# Patient Record
Sex: MALE | Race: BLACK OR AFRICAN AMERICAN
[De-identification: names, ages, dates, MRNs, and addresses within clinical notes are randomized per-mention and may not be internally consistent; named-entity substitution may affect disease eponyms.]

---

## 2018-07-04 ENCOUNTER — HOSPITAL ENCOUNTER (EMERGENCY)
Dept: HOSPITAL 61 - ER | Age: 48
Discharge: HOME | End: 2018-07-04
Payer: COMMERCIAL

## 2018-07-04 DIAGNOSIS — R31.9: ICD-10-CM

## 2018-07-04 DIAGNOSIS — R10.32: Primary | ICD-10-CM

## 2018-07-04 DIAGNOSIS — J45.909: ICD-10-CM

## 2018-07-04 DIAGNOSIS — I10: ICD-10-CM

## 2018-07-04 DIAGNOSIS — E78.00: ICD-10-CM

## 2018-07-04 DIAGNOSIS — Z88.0: ICD-10-CM

## 2018-07-04 DIAGNOSIS — I25.2: ICD-10-CM

## 2018-07-04 LAB
ADD MAN DIFF?: NO
ANION GAP SERPL CALC-SCNC: 8 MMOL/L (ref 6–14)
BACTERIA #/AREA URNS HPF: 0 /HPF
BASO #: 0 X10^3/UL (ref 0–0.2)
BASO %: 0 % (ref 0–3)
BILIRUBIN,URINE: NEGATIVE
BLOOD UREA NITROGEN: 17 MG/DL (ref 8–26)
CALCIUM: 8.2 MG/DL (ref 8.5–10.1)
CHLORIDE: 103 MMOL/L (ref 98–107)
CLARITY,URINE: (no result)
CO2 SERPL-SCNC: 27 MMOL/L (ref 21–32)
COLOR,URINE: YELLOW
CREAT SERPL-MCNC: 1.4 MG/DL (ref 0.7–1.3)
EOS #: 0.1 X10^3/UL (ref 0–0.7)
EOS %: 2 % (ref 0–3)
GFR SERPLBLD BASED ON 1.73 SQ M-ARVRAT: 66 ML/MIN
GLUCOSE SERPL-MCNC: 108 MG/DL (ref 70–99)
GLUCOSE,URINE: NEGATIVE MG/DL
HCG SERPL-ACNC: 4.6 X10^3/UL (ref 4–11)
HEMATOCRIT: 39.7 % (ref 39–53)
HEMOGLOBIN: 13.8 G/DL (ref 13–17.5)
LYMPH #: 1.8 X10^3/UL (ref 1–4.8)
LYMPH %: 38 % (ref 24–48)
MEAN CORPUSCULAR HEMOGLOBIN: 30 PG (ref 25–35)
MEAN CORPUSCULAR HGB CONC: 35 G/DL (ref 31–37)
MEAN CORPUSCULAR VOLUME: 86 FL (ref 79–100)
MONO #: 0.4 X10^3/UL (ref 0–1.1)
MONO %: 8 % (ref 0–9)
NEUT #: 2.4 X10^3UL (ref 1.8–7.7)
NEUT %: 51 % (ref 31–73)
NITRITE UR QL STRIP: NEGATIVE
PH,URINE: 7.5
PLATELET COUNT: 230 X10^3/UL (ref 140–400)
POTASSIUM SERPL-SCNC: 3.3 MMOL/L (ref 3.5–5.1)
PROTEIN,URINE: NEGATIVE MG/DL
RBC,URINE: (no result) /HPF (ref 0–2)
RED BLOOD COUNT: 4.6 X10^6/UL (ref 4.3–5.7)
RED CELL DISTRIBUTION WIDTH: 13.5 % (ref 11.5–14.5)
SODIUM: 138 MMOL/L (ref 136–145)
SPECIFIC GRAVITY,URINE: 1.01
SQUAMOUS EPITHELIAL CELL,UR: (no result) /LPF
UROBILINOGEN,URINE: 0.2 MG/DL
WBC #/AREA URNS HPF: (no result) /HPF (ref 0–4)

## 2018-07-04 PROCEDURE — 96374 THER/PROPH/DIAG INJ IV PUSH: CPT

## 2018-07-04 PROCEDURE — 85025 COMPLETE CBC W/AUTO DIFF WBC: CPT

## 2018-07-04 PROCEDURE — 99285 EMERGENCY DEPT VISIT HI MDM: CPT

## 2018-07-04 PROCEDURE — 80048 BASIC METABOLIC PNL TOTAL CA: CPT

## 2018-07-04 PROCEDURE — 74177 CT ABD & PELVIS W/CONTRAST: CPT

## 2018-07-04 PROCEDURE — 36415 COLL VENOUS BLD VENIPUNCTURE: CPT

## 2018-07-04 PROCEDURE — 81001 URINALYSIS AUTO W/SCOPE: CPT

## 2018-07-04 PROCEDURE — 99284 EMERGENCY DEPT VISIT MOD MDM: CPT

## 2018-07-04 RX ADMIN — MORPHINE SULFATE 1 MG: 4 INJECTION, SOLUTION INTRAMUSCULAR; INTRAVENOUS at 04:18

## 2018-07-04 RX ADMIN — IOHEXOL 1 ML: 300 INJECTION, SOLUTION INTRAVENOUS at 04:51

## 2018-07-04 RX ADMIN — BACITRACIN 1 MLS/HR: 5000 INJECTION, POWDER, FOR SOLUTION INTRAMUSCULAR at 04:18

## 2018-08-06 ENCOUNTER — HOSPITAL ENCOUNTER (EMERGENCY)
Dept: HOSPITAL 61 - ER | Age: 48
Discharge: HOME | End: 2018-08-06
Payer: COMMERCIAL

## 2018-08-06 DIAGNOSIS — R00.2: ICD-10-CM

## 2018-08-06 DIAGNOSIS — K21.9: ICD-10-CM

## 2018-08-06 DIAGNOSIS — J45.909: ICD-10-CM

## 2018-08-06 DIAGNOSIS — Z88.0: ICD-10-CM

## 2018-08-06 DIAGNOSIS — R07.89: Primary | ICD-10-CM

## 2018-08-06 DIAGNOSIS — I10: ICD-10-CM

## 2018-08-06 DIAGNOSIS — E78.00: ICD-10-CM

## 2018-08-06 LAB
ADD MAN DIFF?: NO
ANION GAP SERPL CALC-SCNC: 10 MMOL/L (ref 6–14)
BASO #: 0 X10^3/UL (ref 0–0.2)
BASO %: 1 % (ref 0–3)
BLOOD UREA NITROGEN: 14 MG/DL (ref 8–26)
CALCIUM: 8.9 MG/DL (ref 8.5–10.1)
CHLORIDE: 104 MMOL/L (ref 98–107)
CO2 SERPL-SCNC: 27 MMOL/L (ref 21–32)
CREAT SERPL-MCNC: 1.4 MG/DL (ref 0.7–1.3)
EOS #: 0.1 X10^3/UL (ref 0–0.7)
EOS %: 2 % (ref 0–3)
GFR SERPLBLD BASED ON 1.73 SQ M-ARVRAT: 66 ML/MIN
GLUCOSE SERPL-MCNC: 108 MG/DL (ref 70–99)
HCG SERPL-ACNC: 4.7 X10^3/UL (ref 4–11)
HEMATOCRIT: 41.5 % (ref 39–53)
HEMOGLOBIN: 14.2 G/DL (ref 13–17.5)
LYMPH #: 1.9 X10^3/UL (ref 1–4.8)
LYMPH %: 39 % (ref 24–48)
MEAN CORPUSCULAR HEMOGLOBIN: 30 PG (ref 25–35)
MEAN CORPUSCULAR HGB CONC: 34 G/DL (ref 31–37)
MEAN CORPUSCULAR VOLUME: 87 FL (ref 79–100)
MONO #: 0.5 X10^3/UL (ref 0–1.1)
MONO %: 11 % (ref 0–9)
NEUT #: 2.2 X10^3UL (ref 1.8–7.7)
NEUT %: 48 % (ref 31–73)
PLATELET COUNT: 235 X10^3/UL (ref 140–400)
POTASSIUM SERPL-SCNC: 4.1 MMOL/L (ref 3.5–5.1)
RED BLOOD COUNT: 4.8 X10^6/UL (ref 4.3–5.7)
RED CELL DISTRIBUTION WIDTH: 13.7 % (ref 11.5–14.5)
SODIUM: 141 MMOL/L (ref 136–145)
TROPONINI: < 0.017 NG/ML (ref 0–0.06)

## 2018-08-06 PROCEDURE — 36415 COLL VENOUS BLD VENIPUNCTURE: CPT

## 2018-08-06 PROCEDURE — 93005 ELECTROCARDIOGRAM TRACING: CPT

## 2018-08-06 PROCEDURE — 71045 X-RAY EXAM CHEST 1 VIEW: CPT

## 2018-08-06 PROCEDURE — 84484 ASSAY OF TROPONIN QUANT: CPT

## 2018-08-06 PROCEDURE — 80048 BASIC METABOLIC PNL TOTAL CA: CPT

## 2018-08-06 PROCEDURE — 99285 EMERGENCY DEPT VISIT HI MDM: CPT

## 2018-08-06 PROCEDURE — 85025 COMPLETE CBC W/AUTO DIFF WBC: CPT

## 2018-08-06 RX ADMIN — Medication 1 ML: at 06:53

## 2018-08-06 RX ADMIN — ASPIRIN 1 MG: 325 TABLET, DELAYED RELEASE ORAL at 06:53

## 2018-08-06 RX ADMIN — BACITRACIN 1 MLS/HR: 5000 INJECTION, POWDER, FOR SOLUTION INTRAMUSCULAR at 07:51

## 2019-01-19 ENCOUNTER — HOSPITAL ENCOUNTER (EMERGENCY)
Dept: HOSPITAL 61 - ER | Age: 49
Discharge: HOME | End: 2019-01-19
Payer: COMMERCIAL

## 2019-01-19 VITALS
DIASTOLIC BLOOD PRESSURE: 95 MMHG | DIASTOLIC BLOOD PRESSURE: 95 MMHG | SYSTOLIC BLOOD PRESSURE: 145 MMHG | SYSTOLIC BLOOD PRESSURE: 145 MMHG | SYSTOLIC BLOOD PRESSURE: 145 MMHG | DIASTOLIC BLOOD PRESSURE: 95 MMHG

## 2019-01-19 VITALS — WEIGHT: 210 LBS | BODY MASS INDEX: 31.83 KG/M2 | HEIGHT: 68 IN

## 2019-01-19 DIAGNOSIS — K21.9: ICD-10-CM

## 2019-01-19 DIAGNOSIS — R07.89: Primary | ICD-10-CM

## 2019-01-19 DIAGNOSIS — Z88.0: ICD-10-CM

## 2019-01-19 DIAGNOSIS — I25.2: ICD-10-CM

## 2019-01-19 DIAGNOSIS — Z72.0: ICD-10-CM

## 2019-01-19 DIAGNOSIS — I10: ICD-10-CM

## 2019-01-19 DIAGNOSIS — J45.909: ICD-10-CM

## 2019-01-19 DIAGNOSIS — E78.00: ICD-10-CM

## 2019-01-19 LAB
ANION GAP SERPL CALC-SCNC: 8 MMOL/L (ref 6–14)
APTT PPP: YELLOW S
BACTERIA #/AREA URNS HPF: (no result) /HPF
BASOPHILS # BLD AUTO: 0 X10^3/UL (ref 0–0.2)
BASOPHILS NFR BLD: 1 % (ref 0–3)
BILIRUB UR QL STRIP: NEGATIVE
BUN SERPL-MCNC: 16 MG/DL (ref 8–26)
CALCIUM SERPL-MCNC: 8.8 MG/DL (ref 8.5–10.1)
CHLORIDE SERPL-SCNC: 105 MMOL/L (ref 98–107)
CO2 SERPL-SCNC: 28 MMOL/L (ref 21–32)
CREAT SERPL-MCNC: 1.5 MG/DL (ref 0.7–1.3)
EOSINOPHIL NFR BLD: 0.1 X10^3/UL (ref 0–0.7)
EOSINOPHIL NFR BLD: 4 % (ref 0–3)
ERYTHROCYTE [DISTWIDTH] IN BLOOD BY AUTOMATED COUNT: 13.1 % (ref 11.5–14.5)
FIBRINOGEN PPP-MCNC: CLEAR MG/DL
GFR SERPLBLD BASED ON 1.73 SQ M-ARVRAT: 60.7 ML/MIN
GLUCOSE SERPL-MCNC: 110 MG/DL (ref 70–99)
HCT VFR BLD CALC: 40.5 % (ref 39–53)
HGB BLD-MCNC: 14 G/DL (ref 13–17.5)
LYMPHOCYTES # BLD: 1.3 X10^3/UL (ref 1–4.8)
LYMPHOCYTES NFR BLD AUTO: 34 % (ref 24–48)
MCH RBC QN AUTO: 30 PG (ref 25–35)
MCHC RBC AUTO-ENTMCNC: 35 G/DL (ref 31–37)
MCV RBC AUTO: 87 FL (ref 79–100)
MONO #: 0.3 X10^3/UL (ref 0–1.1)
MONOCYTES NFR BLD: 9 % (ref 0–9)
NEUT #: 2 X10^3UL (ref 1.8–7.7)
NEUTROPHILS NFR BLD AUTO: 53 % (ref 31–73)
NITRITE UR QL STRIP: NEGATIVE
PH UR STRIP: 6.5 [PH]
PLATELET # BLD AUTO: 198 X10^3/UL (ref 140–400)
POTASSIUM SERPL-SCNC: 4 MMOL/L (ref 3.5–5.1)
PROT UR STRIP-MCNC: NEGATIVE MG/DL
RBC # BLD AUTO: 4.66 X10^6/UL (ref 4.3–5.7)
RBC #/AREA URNS HPF: (no result) /HPF (ref 0–2)
SODIUM SERPL-SCNC: 141 MMOL/L (ref 136–145)
SQUAMOUS #/AREA URNS LPF: (no result) /LPF
UROBILINOGEN UR-MCNC: 0.2 MG/DL
WBC # BLD AUTO: 3.9 X10^3/UL (ref 4–11)
WBC #/AREA URNS HPF: (no result) /HPF (ref 0–4)

## 2019-01-19 PROCEDURE — 83880 ASSAY OF NATRIURETIC PEPTIDE: CPT

## 2019-01-19 PROCEDURE — 84484 ASSAY OF TROPONIN QUANT: CPT

## 2019-01-19 PROCEDURE — 96374 THER/PROPH/DIAG INJ IV PUSH: CPT

## 2019-01-19 PROCEDURE — 36415 COLL VENOUS BLD VENIPUNCTURE: CPT

## 2019-01-19 PROCEDURE — 99284 EMERGENCY DEPT VISIT MOD MDM: CPT

## 2019-01-19 PROCEDURE — 80048 BASIC METABOLIC PNL TOTAL CA: CPT

## 2019-01-19 PROCEDURE — 85025 COMPLETE CBC W/AUTO DIFF WBC: CPT

## 2019-01-19 PROCEDURE — 93005 ELECTROCARDIOGRAM TRACING: CPT

## 2019-01-19 PROCEDURE — 81001 URINALYSIS AUTO W/SCOPE: CPT

## 2019-01-19 NOTE — EKG
[facility]

              [address] [place] [address]

Test Date:    2019               Test Time:    10:00:17

Pat Name:     CARMEN CLEARY               Department:   

Patient ID:   [id number]           Room:          

Gender:       M                        Technician:   

:          [id number]               Requested By: MITA JADE

Order Number: [id number]           Reading MD:   Humphrey Paige

                                 Measurements

Intervals                              Axis          

Rate:         59                       P:            30

OH:           138                      QRS:          -21

QRSD:         100                      T:            -5

QT:           412                                    

QTc:          408                                    

                           Interpretive Statements

SINUS RHYTHM

LEFTWARD AXIS

CONSIDER LEFT VENTRICULAR HYPERTROPHY

T ABNORMALITY IN INFERIOR LEADS

ABNORMAL ECG



Electronically Signed On 2019 16:47:53 CST by Humphrey Paige

## 2019-01-19 NOTE — PHYS DOC
Past Medical History


Past Medical History:  Asthma, High Cholesterol, Hypertension


Additional Past Medical Histor:  MILD MI


Past Surgical History:  Other


Additional Past Surgical Histo:  LIPOMA REMOVAL


Alcohol Use:  None


Drug Use:  None





Adult General


Chief Complaint


Chief Complaint:  ABDOMINAL PAIN





HPI


HPI





Patient is a 48  year old male who presents with chest pain.  Patient endorses 

a prior history of reflux. He states the symptoms feel similar to his reflux. 

He has been evaluated by his primary care doctor and is also scheduled to 

undergo stress testing in the next couple of weeks. Patient is treated with 

Carafate and some proton pump inhibitor medications at home. He woke this 

morning with onset of epigastric pain that he states radiates to his chest 

intermittently. He did not eat food prior to onset of symptoms. He has no 

shortness of breath. No radiation to the arm or jaw. No recent fever, chills, 

cough.  Patient does not have known history of coronary artery disease but he 

does take medications for high cholesterol and high blood pressure. He has 

tobacco use. He does not have diabetes.





Review of Systems


Review of Systems





Constitutional: Denies fever or chills 


Eyes: Denies change in visual acuity


HENT: Denies nasal congestion or sore throat 


Respiratory: Denies cough or shortness of breath 


Cardiovascular: No additional information not addressed in HPI 


GI: Denies abdominal pain, nausea


: Denies dysuria or hematuria


Musculoskeletal: Denies back pain 


Integument: Denies rash or skin lesions 


Neurologic: Denies headache


Endocrine: Denies polyuria





All other systems were reviewed and found to be within normal limits, except as 

documented in this note.





Current Medications


Current Medications





Current Medications








 Medications


  (Trade)  Dose


 Ordered  Sig/Neptali  Start Time


 Stop Time Status Last Admin


Dose Admin


 


 Amlodipine


 Besylate


  (Norvasc)  10 mg  1X  ONCE  19 07:00


 19 07:01 DC 19 06:33


10 MG


 


 Famotidine


  (Pepcid Vial)  20 mg  1X  ONCE  19 07:00


 19 07:01 DC 19 06:30


20 MG


 


 Multi-Ingredient


 Mouthwash/Gargle


  (Gi Cocktail)  20 ml  1X  ONCE  19 07:00


 19 07:01 DC 19 06:30


20 ML











Allergies


Allergies





Allergies








Coded Allergies Type Severity Reaction Last Updated Verified


 


  Penicillins Allergy Intermediate rash 18 Yes











Physical Exam


Physical Exam





Constitutional: Well developed, well nourished, no acute distress, non-toxic 

appearance


HENT: Normocephalic, atraumatic, bilateral external ears normal, oropharynx 

moist


Eyes: PERRLA, EOMI, conjunctiva normal 


Neck: Normal range of motion, no tenderness 


Cardiovascular:Heart rate regular rhythm, no murmur


Lungs & Thorax:  Bilateral breath sounds clear to auscultation 


Abdomen: Bowel sounds normal, soft, no tenderness 


Skin: Warm, dry, no erythema  


Extremities: No edema 


Neurologic: Alert and oriented X 3


Psychologic: Affect normal





Current Patient Data


Vital Signs





 Vital Signs








  Date Time  Temp Pulse Resp B/P (MAP) Pulse Ox O2 Delivery O2 Flow Rate FiO2


 


19 10:04  65 18 145/95 (112) 98 Room Air  


 


19 06:10 98.8       





 98.8       








Lab Values





 Laboratory Tests








Test


 19


06:28 19


07:09 19


09:30


 


White Blood Count


 3.9 x10^3/uL


(4.0-11.0)  L 


 





 


Red Blood Count


 4.66 x10^6/uL


(4.30-5.70) 


 





 


Hemoglobin


 14.0 g/dL


(13.0-17.5) 


 





 


Hematocrit


 40.5 %


(39.0-53.0) 


 





 


Mean Corpuscular Volume


 87 fL ()


 


 





 


Mean Corpuscular Hemoglobin 30 pg (25-35)    


 


Mean Corpuscular Hemoglobin


Concent 35 g/dL


(31-37) 


 





 


Red Cell Distribution Width


 13.1 %


(11.5-14.5) 


 





 


Platelet Count


 198 x10^3/uL


(140-400) 


 





 


Neutrophils (%) (Auto) 53 % (31-73)    


 


Lymphocytes (%) (Auto) 34 % (24-48)    


 


Monocytes (%) (Auto) 9 % (0-9)    


 


Eosinophils (%) (Auto) 4 % (0-3)  H  


 


Basophils (%) (Auto) 1 % (0-3)    


 


Neutrophils # (Auto)


 2.0 x10^3uL


(1.8-7.7) 


 





 


Lymphocytes # (Auto)


 1.3 x10^3/uL


(1.0-4.8) 


 





 


Monocytes # (Auto)


 0.3 x10^3/uL


(0.0-1.1) 


 





 


Eosinophils # (Auto)


 0.1 x10^3/uL


(0.0-0.7) 


 





 


Basophils # (Auto)


 0.0 x10^3/uL


(0.0-0.2) 


 





 


Sodium Level


 141 mmol/L


(136-145) 


 





 


Potassium Level


 4.0 mmol/L


(3.5-5.1) 


 





 


Chloride Level


 105 mmol/L


() 


 





 


Carbon Dioxide Level


 28 mmol/L


(21-32) 


 





 


Anion Gap 8 (6-14)    


 


Blood Urea Nitrogen


 16 mg/dL


(8-26) 


 





 


Creatinine


 1.5 mg/dL


(0.7-1.3)  H 


 





 


Estimated GFR


(Cockcroft-Gault) 60.7  


 


 





 


Glucose Level


 110 mg/dL


(70-99)  H 


 





 


Calcium Level


 8.8 mg/dL


(8.5-10.1) 


 





 


Troponin I Quantitative


 < 0.017 ng/mL


(0.000-0.055) 


 < 0.017 ng/mL


(0.000-0.055)


 


NT-Pro-B-Type Natriuretic


Peptide 8 pg/mL


(0-124) 


 





 


Urine Collection Type  Unknown   


 


Urine Color  Yellow   


 


Urine Clarity  Clear   


 


Urine pH  6.5   


 


Urine Specific Gravity  1.020   


 


Urine Protein


 


 Negative mg/dL


(NEG-TRACE) 





 


Urine Glucose (UA)


 


 Negative mg/dL


(NEG) 





 


Urine Ketones (Stick)


 


 Negative mg/dL


(NEG) 





 


Urine Blood


 


 Negative (NEG)


 





 


Urine Nitrite


 


 Negative (NEG)


 





 


Urine Bilirubin


 


 Negative (NEG)


 





 


Urine Urobilinogen Dipstick


 


 0.2 mg/dL (0.2


mg/dL) 





 


Urine Leukocyte Esterase


 


 Negative (NEG)


 





 


Urine RBC


 


 Occ /HPF (0-2)


 





 


Urine WBC


 


 1-4 /HPF (0-4)


 





 


Urine Squamous Epithelial


Cells 


 Few /LPF  


 





 


Urine Bacteria


 


 Few /HPF


(0-FEW) 





 


Urine Mucus  Mod /LPF   





 Laboratory Tests


19 06:28








 Laboratory Tests


19 06:28











EKG


EKG


No STEMI


Interpretation Time:


06:15





Radiology/Procedures


Radiology/Procedures


[]





Course & Med Decision Making


Course & Med Decision Making


Pertinent Labs and Imaging studies reviewed. (See chart for details)





06:15:  Patient is seen and examined.  GI cocktail and Pepcid ordered.  EKG is 

non-acute.





HEART Score:


History:  0


EC


Age:    1


Risk Factors:    2  (tobacco, HTN, HLD, fam hx)





Total Score:  3





07:40:  Patient currently improved.  BP trending downward.  No acute findings 

on lab panel.  Heart score 3.  Plan is to do 3-hr delta trop in ER.  Patient is 

agreeable.











Patient was evaluated in the emergency department for some chest pain which she 

states felt very typical for his reflux symptoms. Heart score totaled 3. 

Patient felt improved after GI cocktail and IV Pepcid. Patient was observed in 

the emergency department for a 3 hour delta troponin which was not elevated. 

His EKG was nonacute also at the 3 hour interval. Patient was discharged to 

home. He already has cardiology follow-up pending. Patient also is referred to 

GI for evaluation of chronic GERD symptoms that are poorly controlled on PPI 

therapy. Patient is agreeable to the plan of care. All of his questions are 

answered.





Dragon Disclaimer


Dragon Disclaimer


This electronic medical record was generated, in whole or in part, using a 

voice recognition dictation system.





Departure


Departure


Disposition:  01 HOME, SELF-CARE


Condition:  GOOD


Referrals:  


TIMOTHY ANDERSON MD (PCP)











MITA JADE DO 2019 06:20

## 2019-01-19 NOTE — EKG
[facility]

              [address] [place] [address]

Test Date:    2019               Test Time:    06:12:54

Pat Name:     CARMEN CLEARY               Department:   

Patient ID:   [id number]           Room:          

Gender:       Male                     Technician:   

:          [id number]               Requested By: MITA JADE

Order Number: [id number]           Reading MD:   Humphrey Paige

                                 Measurements

Intervals                              Axis          

Rate:         80                       P:            47

OK:           140                      QRS:          -22

QRSD:         102                      T:            7

QT:           374                                    

QTc:          435                                    

                           Interpretive Statements

SINUS RHYTHM

LEFTWARD AXIS

LEFT VENTRICULAR HYPERTROPHY



Electronically Signed On 2019 14:50:28 CST by Humphrey Paige

## 2019-02-22 ENCOUNTER — HOSPITAL ENCOUNTER (OUTPATIENT)
Dept: HOSPITAL 61 - ECHO | Age: 49
Discharge: HOME | End: 2019-02-22
Attending: INTERNAL MEDICINE
Payer: COMMERCIAL

## 2019-02-22 DIAGNOSIS — R53.83: ICD-10-CM

## 2019-02-22 DIAGNOSIS — I10: ICD-10-CM

## 2019-02-22 DIAGNOSIS — F17.210: ICD-10-CM

## 2019-02-22 DIAGNOSIS — R07.9: Primary | ICD-10-CM

## 2019-02-22 PROCEDURE — 93350 STRESS TTE ONLY: CPT

## 2019-02-22 PROCEDURE — 93017 CV STRESS TEST TRACING ONLY: CPT

## 2019-02-22 NOTE — CARD
MR#: [id number]

Account#: [id number]

Accession#: [id number]

Date of Study: 02/22/2019

Ordering Physician: YESI ORTEGA, 

Referring Physician: YESI ORTEGA, 

Tech: Silvia Vogt CS





--------------- APPROVED REPORT --------------





INDICATION

Chest Pain 



RISK FACTORS

Hypertension 

Smoking 



Reason : Patient complained of pain



PROCEDURE

The patient underwent an Exercise Stress Test using the Amrik Protocol. Blood pressure, heart rate, a
nd EKG were monitored.

An Echocardiogram was performed by technician in four stages in quad fashion.  At peak stress four se
lected images were obtained and placed side by side with resting images for comparison.



STRESS ECHO FINDINGS

The resting Echocardiogram showed normal left ventricular systolic contractility with an estimated Ej
ection Fraction of about 60 %. 

The Resting Echocardiogram showed normal augmentation of myocardial wall segments using a 16 segment 
model.

The Stress Echocardiogram showed normal augmentation of myocardial wall segments using a 16 segment m
yeimy.

The Stress Echocardiogram left ventricular systolic contractility has an estimated Ejection Fraction 
of about 75%. 



Test Type:          Exercise

Stress Nurse/Tech: Lanny Carmen R.N.

Test Indications: c/p

Cardiac History and Allergies: See EHR

Medications:     See EHR

Medical History: see EHR

Resting ECG:     SR

Resting Heart Rate: 65 bpm

Resting Blood Pressure: 158/80mmHg

Pretest Chest Pain: No chest pain



Nurse/Tech Notes

S1S2, lungs CTA



Stress Symptoms

chest pain left side scale 4/10 which resolved by end of recovery period, fatigue



POST EXERCISE

Reason for Termination: Reached target heart rate

Target HR: Yes

Max HR: 156 bpm

90% of Maximum Predicted HR: 173 bpm

Exercise duration: 9:03 min:sec, 3 Stage

Exercise capacity: 10.1METs

Max Blood Pressure: 210/102mmHg

Blood Pressure response to exercise: Normal blood pressure response during stress.

Heart Rate response to exercise: wnl

Chest Pain: Yes. see above note

Arrhythmia: No. 

ST Change: No. 



INTERPRETATION

Stress EKG Conclusion: Baseline EKG showed sinus rhythm.  No ischemic changes at peak stress.  No arr
hythmias.



Preliminary Notification

Critical Value: No



<Conclusion>

Treadmill exercise stress echocardiogram did not show any evidence of ischemia or infarct.

Normal left ventricle systolic function with ejection fraction estimated at 60%.

Patient had good activity tolerance.  Low risk for cardiac events.



Signed by : Humphrey Paige, 

Electronically Approved : 02/22/2019 14:43:06

## 2019-06-10 ENCOUNTER — HOSPITAL ENCOUNTER (OUTPATIENT)
Dept: HOSPITAL 61 - KCIC | Age: 49
Discharge: HOME | End: 2019-06-10
Attending: FAMILY MEDICINE
Payer: COMMERCIAL

## 2019-06-10 DIAGNOSIS — Z87.891: ICD-10-CM

## 2019-06-10 DIAGNOSIS — M41.84: Primary | ICD-10-CM

## 2019-06-10 PROCEDURE — 71046 X-RAY EXAM CHEST 2 VIEWS: CPT

## 2019-06-10 NOTE — [facility]
CHEST PA   LATERAL

 

History: Chronic smoker, quit 1 month ago. Night sweats for 2 months.

 

FINDINGS:

There are low lung volumes. Cardiac silhouette is stable and not thought 

to be enlarged. No evidence of pneumothorax. No evidence of pleural 

effusion. No solid infiltrate. Bones appear intact. Thoracic scoliosis 

again seen.

 

IMPRESSION: Low lung volumes, but no evidence of consolidating infiltrate.

 

Electronically signed by: Andrea Artis MD (6/10/2019 5:36 PM) [facility]

## 2020-03-21 ENCOUNTER — HOSPITAL ENCOUNTER (EMERGENCY)
Dept: HOSPITAL 61 - ER | Age: 50
Discharge: HOME | End: 2020-03-21
Payer: COMMERCIAL

## 2020-03-21 VITALS — WEIGHT: 220.46 LBS | HEIGHT: 68 IN | BODY MASS INDEX: 33.41 KG/M2

## 2020-03-21 VITALS
SYSTOLIC BLOOD PRESSURE: 170 MMHG | SYSTOLIC BLOOD PRESSURE: 170 MMHG | SYSTOLIC BLOOD PRESSURE: 170 MMHG | DIASTOLIC BLOOD PRESSURE: 92 MMHG | DIASTOLIC BLOOD PRESSURE: 92 MMHG | DIASTOLIC BLOOD PRESSURE: 92 MMHG

## 2020-03-21 DIAGNOSIS — K21.9: ICD-10-CM

## 2020-03-21 DIAGNOSIS — E78.00: ICD-10-CM

## 2020-03-21 DIAGNOSIS — I25.2: ICD-10-CM

## 2020-03-21 DIAGNOSIS — I10: ICD-10-CM

## 2020-03-21 DIAGNOSIS — J45.909: ICD-10-CM

## 2020-03-21 DIAGNOSIS — Z88.0: ICD-10-CM

## 2020-03-21 DIAGNOSIS — N48.89: Primary | ICD-10-CM

## 2020-03-21 DIAGNOSIS — F17.200: ICD-10-CM

## 2020-03-21 DIAGNOSIS — L29.8: ICD-10-CM

## 2020-03-21 PROCEDURE — 99281 EMR DPT VST MAYX REQ PHY/QHP: CPT

## 2020-03-21 NOTE — PHYS DOC
Past Medical History


Past Medical History:  Asthma, GERD, High Cholesterol, Hypertension


Additional Past Medical Histor:  MILD MI


Past Surgical History:  Other


Additional Past Surgical Histo:  LIPOMA REMOVAL


Smoking Status:  Current Every Day Smoker


Alcohol Use:  None


Drug Use:  None





Adult General


Chief Complaint


Chief Complaint:  SEXUALLY TRANSMITTED DISEASE





HPI


HPI





49-year-old male presents the emergency department complaints of skin irritation

to his penis.  Patient states he noticed a couple days ago a white dot on the s

haft of his penis however that has subsequently resolved.  He now has some 

itching.  Patient states he was concerned he may have inflammation or infection 

or possible bite.  Patient denies any fever recent sexual contact, denies any 

discharge.  Nothing makes his symptoms worse, nothing makes his symptoms better.

 Patient denies any abdominal pain, nausea, vomiting, fever, discharge





Review of Systems


Review of Systems





Constitutional: Denies fever or chills []


Cardiovascular: No additional information not addressed in HPI []


GI: Denies abdominal pain, nausea, vomiting, bloody stools or diarrhea []


Integument: skin irritation appreciated to shaft of penis


Neurologic: Denies headache, focal weakness or sensory changes []





All other systems were reviewed and found to be within normal limits, except as 

documented in this note.





Allergies


Allergies





Allergies








Coded Allergies Type Severity Reaction Last Updated Verified


 


  Penicillins Allergy Intermediate rash 8/6/18 Yes











Physical Exam


Physical Exam





Constitutional: Well developed, well nourished, no acute distress, non-toxic 

appearance. []


HENT: Normocephalic, atraumatic, bilateral external ears normal, oropharynx 

moist, no oral exudates, nose normal. []


Skin: Warm, dry, erythema appreciated to shaft of penis - no active vesicular 

lesion appreciated, no drainage


Extremities: No tenderness, no edema. []





EKG


EKG


[]





Radiology/Procedures


Radiology/Procedures


[]





Course & Med Decision Making


Course & Med Decision Making


Pertinent Labs and Imaging studies reviewed. (See chart for details)





[]49-year-old male presents the emergency department complaints of skin irritati

on to his penis.  Patient states he noticed a couple days ago a white dot on the

 shaft of his penis however that has subsequently resolved.  He now has some 

itching.  Patient states he was concerned he may have inflammation or infection 

or possible bite.  Patient denies any fever recent sexual contact, denies any 

discharge.  Nothing makes his symptoms worse, nothing makes his symptoms better.

  Patient denies any abdominal pain, nausea, vomiting, fever, discharge





Dragon Disclaimer


Dragon Disclaimer


This electronic medical record was generated, in whole or in part, using a voice

 recognition dictation system.





Departure


Departure


Impression:  


   Primary Impression:  


   Skin irritation


Disposition:  01 HOME, SELF-CARE


Condition:  STABLE


Referrals:  


RACQUEL DAMON MD (PCP)


Patient Instructions:  Itching-Brief





Additional Instructions:  





No evidence of STD/STI


No cellulitis/infection appreciated


Benadryl over the counter


Vaseline can be used as a barrier cream











DEBI KRUEGER MD              Mar 21, 2020 05:22

## 2020-07-07 ENCOUNTER — HOSPITAL ENCOUNTER (OUTPATIENT)
Dept: HOSPITAL 61 - KCIC CT | Age: 50
End: 2020-07-07
Attending: SURGERY
Payer: COMMERCIAL

## 2020-07-07 DIAGNOSIS — K57.30: ICD-10-CM

## 2020-07-07 DIAGNOSIS — K82.0: ICD-10-CM

## 2020-07-07 DIAGNOSIS — I70.0: ICD-10-CM

## 2020-07-07 DIAGNOSIS — K40.90: Primary | ICD-10-CM

## 2020-07-07 PROCEDURE — 74177 CT ABD & PELVIS W/CONTRAST: CPT

## 2020-08-10 ENCOUNTER — HOSPITAL ENCOUNTER (OUTPATIENT)
Dept: HOSPITAL 61 - LAB | Age: 50
Discharge: HOME | End: 2020-08-10
Attending: SURGERY
Payer: COMMERCIAL

## 2020-08-10 DIAGNOSIS — Z11.59: ICD-10-CM

## 2020-08-10 DIAGNOSIS — Z01.818: Primary | ICD-10-CM

## 2020-08-10 DIAGNOSIS — Z88.0: ICD-10-CM

## 2020-08-10 DIAGNOSIS — R10.13: ICD-10-CM

## 2020-08-14 ENCOUNTER — HOSPITAL ENCOUNTER (OUTPATIENT)
Dept: HOSPITAL 61 - ENDOS | Age: 50
Discharge: HOME | End: 2020-08-14
Attending: SURGERY
Payer: COMMERCIAL

## 2020-08-14 VITALS — DIASTOLIC BLOOD PRESSURE: 99 MMHG | SYSTOLIC BLOOD PRESSURE: 141 MMHG

## 2020-08-14 DIAGNOSIS — Z79.899: ICD-10-CM

## 2020-08-14 DIAGNOSIS — Z98.890: ICD-10-CM

## 2020-08-14 DIAGNOSIS — B96.81: ICD-10-CM

## 2020-08-14 DIAGNOSIS — Z88.0: ICD-10-CM

## 2020-08-14 DIAGNOSIS — I10: ICD-10-CM

## 2020-08-14 DIAGNOSIS — K29.50: ICD-10-CM

## 2020-08-14 DIAGNOSIS — R10.13: Primary | ICD-10-CM

## 2020-08-14 DIAGNOSIS — F17.210: ICD-10-CM

## 2020-08-14 DIAGNOSIS — K21.9: ICD-10-CM

## 2020-08-14 PROCEDURE — 88342 IMHCHEM/IMCYTCHM 1ST ANTB: CPT

## 2020-08-14 PROCEDURE — 88305 TISSUE EXAM BY PATHOLOGIST: CPT

## 2020-08-14 PROCEDURE — 43239 EGD BIOPSY SINGLE/MULTIPLE: CPT

## 2020-08-17 NOTE — PATHOLOGY
[facility] Accession Number: [id number]

.                                                                01

Material submitted:                                        .

stomach - ANTRUM BIOPSY

.                                                                01

Clinical history:                                          .

Epigastric pain.

.                                                                02

**********************************************************************

Diagnosis:

Gastric biopsy, antrum:

- Active chronic gastritis, moderate to marked, with Helicobacter

organisms identified.

(JPM:[place]; 08/17/2020)

S  08/17/2020  0941 Local

**********************************************************************

.                                                                02

Comment:

Sections of the gastric antral biopy show congestion and moderate to

marked active chronic inflammation.  A properly controlled

immunoperoxidase stain for Helicobacter reveals numerous Helicobacter

organisms.  There is no evidence of malignancy.

(JPM:[place]; 08/17/2020)

.                                                                02

Electronically signed:                                     .

Dakotah Cheek MD, Pathologist

NPI- [id number]

.                                                                01

Gross description:                                         .

Received in formalin labeled "Belgica, Gregory, antrum BX" is a 0.6 x 0.4 x 0.1

cm fragment of tan-brown soft tissue. The specimen is submitted entirely

in A1. ([facility]; 8/16/2020)

[facility]  08/17/2020  0939 Local

.                                                                02

Pathologist provided ICD-10:

K29.50

.                                                                02

CPT                                                        .

428351, V87381

Specimen Comment: A courtesy copy of this report has been sent to [phone], [phone]

Specimen Comment: [id number]

Specimen Comment: Report sent to  / DR DAMON

***Performed at:  01

   [facility]

   [address], [place]  [id number]

   MD Artur Patrick MD Phone:  [phone]

***Performed at:  02

   [facility]

   [address], [place]  [id number]

   MD Dakotah Cheek MD Phone:  [phone]

## 2020-08-20 NOTE — PDOC1
History and Physical


Date of Admission


Date of Admission


DATE: 8/14/20 


TIME: 08:58





Identification/Chief Complaint


Chief Complaint


Epigastric pain





Source


Source:  Patient





History of Present Illness


History of Present Illness


49-year-old male with epigastric pain here for endoscopy





Past Medical History


Cardiovascular:  No pertinent hx


Pulmonary:  No pertinent hx


GI:  No pertinent hx


Heme/Onc:  No pertinent hx


Psych:  No pertinent hx


Rheumatologic:  No pertinent hx


Infectious disease:  No pertinent hx


ENT:  No pertinent hx


Renal/:  No pertinent hx





Past Surgical History


Past Surgical History:  No pertinent history





Family History


Family History:  No Significant





Social History


Smoke:  No


ALCOHOL:  none


Drugs:  None





Current Medications


Current Medications





Current Medications


Ringer's Solution 1,000 ml @  50 mls/hr Q20H IV  Last administered on 8/14/20at 

12:23;  Start 8/14/20 at 07:00;  Stop 8/14/20 at 18:59;  Status DC


Propofol (Diprivan) 200 mg STK-MED ONCE IV ;  Start 8/14/20 at 12:42;  Stop 

8/14/20 at 12:43;  Status DC


Lidocaine HCl (Lidocaine Pf 2% Vial) 5 ml STK-MED ONCE .ROUTE ;  Start 8/14/20 

at 12:42;  Stop 8/14/20 at 12:43;  Status DC





Active Scripts


Active


Reported


Atorvastatin Calcium 80 Mg Tablet 10 Mg PO DAILY


Protonix (Pantoprazole Sodium) 20 Mg Tablet.dr 40 Mg PO DAILY


Amlodipine Besylate 10 Mg Tablet 10 Mg PO DAILY





Allergies


Allergies:  


Coded Allergies:  


     Penicillins (Verified  Allergy, Intermediate, rash, 8/14/20)





ROS


Gastrointestinal:  Yes Abdominal Pain





Physical Exam


General:  Alert, Oriented X3, Cooperative, No acute distress


HEENT:  Atraumatic, EOMI


Lungs:  Clear to auscultation, Normal air movement


Heart:  RRR, no murmurs


Abdomen:  Normal bowel sounds, Soft, Other (Tender to palpation epigastrium)


Rectal Exam:  not examined


Extremities:  No edema


Skin:  No significant lesion





Vitals


Vitals





Vital Signs








  Date Time  Temp Pulse Resp B/P (MAP) Pulse Ox O2 Delivery O2 Flow Rate FiO2


 


8/14/20 13:16  72 20 141/99 99 Room Air  


 


8/14/20 12:58 97.8      3 





 97.8       











VTE Prophylaxis Ordered


VTE Prophylaxis Devices:  No


VTE Pharmacological Prophylaxi:  No





Assessment/Plan


Assessment/Plan


Epigastric pain plan EGD





Justicifation of Admission Dx:


Justifications for Admission:


Justification of Admission Dx:  N/A











JAMES COLÓN MD             Aug 20, 2020 09:01

## 2021-08-13 ENCOUNTER — HOSPITAL ENCOUNTER (EMERGENCY)
Dept: HOSPITAL 61 - ER | Age: 51
Discharge: HOME | End: 2021-08-13
Payer: COMMERCIAL

## 2021-08-13 VITALS
DIASTOLIC BLOOD PRESSURE: 97 MMHG | DIASTOLIC BLOOD PRESSURE: 97 MMHG | SYSTOLIC BLOOD PRESSURE: 153 MMHG | SYSTOLIC BLOOD PRESSURE: 153 MMHG

## 2021-08-13 VITALS — WEIGHT: 209.44 LBS | HEIGHT: 67 IN | BODY MASS INDEX: 32.87 KG/M2

## 2021-08-13 DIAGNOSIS — Z88.0: ICD-10-CM

## 2021-08-13 DIAGNOSIS — H81.11: Primary | ICD-10-CM

## 2021-08-13 DIAGNOSIS — F17.200: ICD-10-CM

## 2021-08-13 DIAGNOSIS — E78.00: ICD-10-CM

## 2021-08-13 DIAGNOSIS — J45.909: ICD-10-CM

## 2021-08-13 DIAGNOSIS — K21.9: ICD-10-CM

## 2021-08-13 DIAGNOSIS — I10: ICD-10-CM

## 2021-08-13 PROCEDURE — 93005 ELECTROCARDIOGRAM TRACING: CPT

## 2021-08-13 PROCEDURE — 99283 EMERGENCY DEPT VISIT LOW MDM: CPT

## 2021-08-13 NOTE — PHYS DOC
Past Medical History


Past Medical History:  Asthma, GERD, High Cholesterol, Hypertension


Additional Past Medical Histor:  "MILD MI"


Past Surgical History:  Other


Additional Past Surgical Histo:  LIPOMA REMOVAL


Smoking Status:  Current Every Day Smoker


Additional Information:  


"I'm trying to stop."


Alcohol Use:  Occasionally


Drug Use:  None





General Adult


EDM:


Chief Complaint:  DIZZY/LIGHT HEADED





HPI:


HPI:





Patient is a 50  year old male who presents with vertigo sensation starting this

morning upon waking up.  It goes away when he lays still or does not move his 

head.  Gets worse when he moves his head.  Will slowly settle out once he stops 

moving.  He has never had similar symptoms in the past.  He has had some 

fullness in his right ear.  No speech difficulty, vision changes, weakness, 

numbness.





Review of Systems:


Review of Systems:


Constitutional:   Denies fever or chills. []


Eyes:   Denies change in visual acuity. []


HENT:   Denies nasal congestion or sore throat. [] 


Respiratory:   Denies cough or shortness of breath. [] 


Cardiovascular:   Denies chest pain or edema. [] 


GI:   Denies abdominal pain, nausea, vomiting, bloody stools or diarrhea. [] 


:  Denies dysuria. [] 


Musculoskeletal:   Denies back pain or joint pain. [] 


Integument:   Denies rash. [] 


Neurologic:  + Vertigo.  Denies headache, focal weakness or sensory changes. [] 


Endocrine:   Denies polyuria or polydipsia. [] 


Lymphatic:  Denies swollen glands. [] 


Psychiatric:  Denies depression or anxiety. []





Heart Score:


C/O Chest Pain:  N/A


Risk Factors:


Risk Factors:  DM, Current or recent (<one month) smoker, HTN, HLP, family 

history of CAD, obesity.


Risk Scores:


Score 0 - 3:  2.5% MACE over next 6 weeks - Discharge Home


Score 4 - 6:  20.3% MACE over next 6 weeks - Admit for Clinical Observation


Score 7 - 10:  72.7% MACE over next 6 weeks - Early Invasive Strategies





Family History:


Family History:


No pertinent family history





Current Medications:





Current Medications








 Medications


  (Trade)  Dose


 Ordered  Sig/Neptali  Start Time


 Stop Time Status Last Admin


Dose Admin


 


 Meclizine HCl


  (Antivert)  25 mg  1X  ONCE  8/13/21 10:00


 8/13/21 10:01 DC 8/13/21 10:25


25 MG











Allergies:


Allergies:





Allergies








Coded Allergies Type Severity Reaction Last Updated Verified


 


  Penicillins Allergy Intermediate rash 8/14/20 Yes











Physical Exam:


PE:





Constitutional: Well developed, well nourished, no acute distress, non-toxic 

appearance. []


HENT: Normocephalic, atraumatic, bilateral external ears normal, oropharynx 

moist, no oral exudates, nose normal. []


Eyes: PERRLA, EOMI, conjunctiva normal, no discharge. [] 


Neck: Normal range of motion, no tenderness, supple, no stridor. [] 


Cardiovascular:Heart rate regular rhythm, no murmur []


Lungs & Thorax:  Bilateral breath sounds clear to auscultation []


Abdomen: Bowel sounds normal, soft, no tenderness, no masses, no pulsatile 

masses. [] 


Skin: Warm, dry, no erythema, no rash. [] 


Back: No tenderness, no CVA tenderness. [] 


Extremities: No tenderness, no cyanosis, no clubbing, ROM intact, no edema. [] 


Neurologic: Alert, oriented.  Speech normal.  Face symmetric.  Cranial nerves 

intact.  5/5 strength in bilateral upper and lower extremities.  No dysmetria in

 the upper or lower extremities. 


Nystagmus with leftward gaze.  Corrective saccade only present with a leftward 

head impulse testing.  He demonstrated a normal test of skew.  []


Psychologic: Affect normal, judgement normal, mood normal. []





Current Patient Data:


Vital Signs:





                                   Vital Signs








  Date Time  Temp Pulse Resp B/P (MAP) Pulse Ox O2 Delivery O2 Flow Rate FiO2


 


8/13/21 09:10 98.7 68 12 153/97 (113) 98 Room Air  





 98.7       











EKG:


EKG:


Sinus rhythm.  Rate 53.  Left axis deviation.  No acute ischemic changes.  []





Radiology/Procedures:


Radiology/Procedures:


[]





Course & Med Decision Making:


Course & Med Decision Making


Pertinent Labs and Imaging studies reviewed. (See chart for details)





Patient a 50-year-old male who presents with positional vertigo.  History and 

exam are consistent with peripheral vertigo, specifically BPPV.  Positive [place]-

Hallpike.  Attempted Epley with some mild relief.  Also improved with Antivert.


Will be safe for discharge with PCP follow-up.  Short course of meclizine 

provided





Dragon Disclaimer:


Dragon Disclaimer:


This electronic medical record was generated, in whole or in part, using a voice

 recognition dictation system.





Departure


Departure


Impression:  


   Primary Impression:  


   BPPV (benign paroxysmal positional vertigo)


Disposition:  01 HOME / SELF CARE / HOMELESS


Condition:  STABLE


Referrals:  


RACQUEL DAMON MD (PCP)


Patient Instructions:  Benign Positional Vertigo





Additional Instructions:  


I think your dizziness is caused by I have Crystal being out of place in your 

middle ear.  You can google Epley maneuver to get instructions for trying to 

reverse this if you have continued symptoms at home.  Please follow-up with your

 primary care doctor.





If this becomes persistent you can attempt to follow up with an ENT office. You 

may need a referral. 





[facility]


[email or web address] 


[address], [place] [address] [phone]


Scripts


Meclizine Hcl (MECLIZINE HCL) 25 Mg Tablet


1 TAB PO Q6HRS for dizziness, #20 TAB


   Prov: RITA CALDERON MD         8/13/21











RITA CALDERON MD              Aug 13, 2021 11:35

## 2021-08-14 NOTE — EKG
[facility]

              [address] [place] [address]

Test Date:    2021               Test Time:    10:02:44

Pat Name:     CARMEN CLEARY               Department:   

Patient ID:   [id number]           Room:          

Gender:       M                        Technician:   

:          1970               Requested By: RITA CALDERON

Order Number: [id number]           Reading MD:     

                                 Measurements

Intervals                              Axis          

Rate:         53                       P:            19

GA:           138                      QRS:          -22

QRSD:         100                      T:            -2

QT:           454                                    

QTc:          428                                    

                           Interpretive Statements

SINUS RHYTHM

LEFTWARD AXIS

QRS(T) CONTOUR ABNORMALITY

CONSIDER ANTEROLATERAL MYOCARDIAL DAMAGE

POSSIBLY ABNORMAL ECG

RI6.01

No previous ECG available for comparison

## 2021-08-25 NOTE — EKG
[facility]

              [address] [place] [address]

Test Date:    2021               Test Time:    10:02:44

Pat Name:     CARMEN CLEARY               Department:   

Patient ID:   [id number]           Room:          

Gender:       M                        Technician:   

:          1970               Requested By: RITA CALDERON

Order Number: [id number]           Reading MD:     

                                 Measurements

Intervals                              Axis          

Rate:         53                       P:            19

[place]:           138                      QRS:          -22

QRSD:         100                      T:            -2

QT:           454                                    

QTc:          428                                    

                           Interpretive Statements

SINUS RHYTHM

LEFTWARD AXIS

QRS(T) CONTOUR ABNORMALITY

CONSIDER ANTEROLATERAL MYOCARDIAL DAMAGE

POSSIBLY ABNORMAL ECG

RI6.01

Compared to ECG 2019 10:00:17

T-wave abnormality no longer present

## 2022-01-17 ENCOUNTER — HOSPITAL ENCOUNTER (OUTPATIENT)
Dept: HOSPITAL 61 - RAD | Age: 52
End: 2022-01-17
Attending: FAMILY MEDICINE
Payer: COMMERCIAL

## 2022-01-17 DIAGNOSIS — M19.041: Primary | ICD-10-CM

## 2022-01-17 DIAGNOSIS — M25.741: ICD-10-CM

## 2022-01-17 PROCEDURE — 73130 X-RAY EXAM OF HAND: CPT

## 2022-01-18 NOTE — RAD
XR HAND_RIGHT 3 VIEWS



History: Right hand pain



Comparison: None.



Technique: 3 views of the right hand.



Findings:

Osseous mineralization is normal. No acute fracture or dislocaton. Mild degenerative changes at the m
etacarpophalangeal and interphalangeal joints with small osteophytes. No aggressive osseous erosive p
rocess. Soft tissues are unremarkable.



Impression: 

1.  Degenerative changes of the right hand without acute osseous abnormality.



Electronically signed by: Justyn Hagan MD (1/18/2022 8:41 AM) [id number]

## 2022-04-14 ENCOUNTER — HOSPITAL ENCOUNTER (EMERGENCY)
Dept: HOSPITAL 61 - ER | Age: 52
Discharge: HOME | End: 2022-04-14
Payer: COMMERCIAL

## 2022-04-14 VITALS — SYSTOLIC BLOOD PRESSURE: 142 MMHG | DIASTOLIC BLOOD PRESSURE: 74 MMHG

## 2022-04-14 VITALS — HEIGHT: 69 IN | WEIGHT: 226.64 LBS | BODY MASS INDEX: 33.57 KG/M2

## 2022-04-14 DIAGNOSIS — I25.2: ICD-10-CM

## 2022-04-14 DIAGNOSIS — K21.9: ICD-10-CM

## 2022-04-14 DIAGNOSIS — F17.200: ICD-10-CM

## 2022-04-14 DIAGNOSIS — E78.00: ICD-10-CM

## 2022-04-14 DIAGNOSIS — K57.30: Primary | ICD-10-CM

## 2022-04-14 DIAGNOSIS — Z88.0: ICD-10-CM

## 2022-04-14 DIAGNOSIS — J45.909: ICD-10-CM

## 2022-04-14 DIAGNOSIS — I10: ICD-10-CM

## 2022-04-14 LAB
ALBUMIN SERPL-MCNC: 3.9 G/DL (ref 3.4–5)
ALBUMIN/GLOB SERPL: 1.1 {RATIO} (ref 1–1.7)
ALP SERPL-CCNC: 71 U/L (ref 46–116)
ALT SERPL-CCNC: 36 U/L (ref 16–63)
ANION GAP SERPL CALC-SCNC: 9 MMOL/L (ref 6–14)
AST SERPL-CCNC: 19 U/L (ref 15–37)
BASOPHILS # BLD AUTO: 0 X10^3/UL (ref 0–0.2)
BASOPHILS NFR BLD: 1 % (ref 0–3)
BILIRUB SERPL-MCNC: 0.3 MG/DL (ref 0.2–1)
BUN SERPL-MCNC: 14 MG/DL (ref 8–26)
BUN/CREAT SERPL: 9 (ref 6–20)
CALCIUM SERPL-MCNC: 9.1 MG/DL (ref 8.5–10.1)
CHLORIDE SERPL-SCNC: 102 MMOL/L (ref 98–107)
CO2 SERPL-SCNC: 28 MMOL/L (ref 21–32)
CREAT SERPL-MCNC: 1.5 MG/DL (ref 0.7–1.3)
EOSINOPHIL NFR BLD: 0.3 X10^3/UL (ref 0–0.7)
EOSINOPHIL NFR BLD: 5 % (ref 0–3)
ERYTHROCYTE [DISTWIDTH] IN BLOOD BY AUTOMATED COUNT: 13.6 % (ref 11.5–14.5)
GFR SERPLBLD BASED ON 1.73 SQ M-ARVRAT: 59.9 ML/MIN
GLUCOSE SERPL-MCNC: 129 MG/DL (ref 70–99)
HCT VFR BLD CALC: 39.7 % (ref 39–53)
HGB BLD-MCNC: 13.7 G/DL (ref 13–17.5)
LIPASE: 69 U/L (ref 73–393)
LYMPHOCYTES # BLD: 1.5 X10^3/UL (ref 1–4.8)
LYMPHOCYTES NFR BLD AUTO: 28 % (ref 24–48)
MCH RBC QN AUTO: 29 PG (ref 25–35)
MCHC RBC AUTO-ENTMCNC: 35 G/DL (ref 31–37)
MCV RBC AUTO: 85 FL (ref 79–100)
MONO #: 0.5 X10^3/UL (ref 0–1.1)
MONOCYTES NFR BLD: 9 % (ref 0–9)
NEUT #: 3.2 X10^3/UL (ref 1.8–7.7)
NEUTROPHILS NFR BLD AUTO: 58 % (ref 31–73)
PLATELET # BLD AUTO: 269 X10^3/UL (ref 140–400)
POTASSIUM SERPL-SCNC: 3.8 MMOL/L (ref 3.5–5.1)
PROT SERPL-MCNC: 7.5 G/DL (ref 6.4–8.2)
RBC # BLD AUTO: 4.66 X10^6/UL (ref 4.3–5.7)
SODIUM SERPL-SCNC: 139 MMOL/L (ref 136–145)
WBC # BLD AUTO: 5.5 X10^3/UL (ref 4–11)

## 2022-04-14 PROCEDURE — 96375 TX/PRO/DX INJ NEW DRUG ADDON: CPT

## 2022-04-14 PROCEDURE — 96374 THER/PROPH/DIAG INJ IV PUSH: CPT

## 2022-04-14 PROCEDURE — 36415 COLL VENOUS BLD VENIPUNCTURE: CPT

## 2022-04-14 PROCEDURE — 99285 EMERGENCY DEPT VISIT HI MDM: CPT

## 2022-04-14 PROCEDURE — 74177 CT ABD & PELVIS W/CONTRAST: CPT

## 2022-04-14 PROCEDURE — 85025 COMPLETE CBC W/AUTO DIFF WBC: CPT

## 2022-04-14 PROCEDURE — 80053 COMPREHEN METABOLIC PANEL: CPT

## 2022-04-14 PROCEDURE — 93005 ELECTROCARDIOGRAM TRACING: CPT

## 2022-04-14 PROCEDURE — 83690 ASSAY OF LIPASE: CPT

## 2022-04-14 NOTE — PHYS DOC
Past Medical History


Past Medical History:  Asthma, GERD, High Cholesterol, Hypertension


Additional Past Medical Histor:  "MILD MI"


Past Surgical History:  Other


Additional Past Surgical Histo:  LIPOMA REMOVAL


Smoking Status:  Current Every Day Smoker


Additional Information:  


0.25 PPD


Alcohol Use:  Occasionally


Drug Use:  None





General Adult


EDM:


Chief Complaint:  ABDOMINAL PAIN





HPI:


HPI:





Patient is a 51  year old male presents to the ER with epigastric pain.  Patient

has a history of gastritis and acid reflux.  Patient states that pain started 

after he ate he had breakfast sausages this morning.  States he usually lives 

healthy diet due to the fact that it he has multiple exacerbations of gastritis.

 Patient states that he took over-the-counter antacids with no relief.  Patient 

denies any nausea or vomiting.





Review of Systems:


Review of Systems:


Constitutional:   Denies fever or chills. []


Eyes:   Denies change in visual acuity. []


HENT:   Denies nasal congestion or sore throat. [] 


Respiratory:   Denies cough or shortness of breath. [] 


Cardiovascular:   Denies chest pain or edema. [] 


GI:   Epigastric pain.  Denies , nausea, vomiting, bloody stools or diarrhea. []

 


:  Denies dysuria. [] 


Musculoskeletal:   Denies back pain or joint pain. [] 


Integument:   Denies rash. [] 


Neurologic:   Denies headache, focal weakness or sensory changes. [] 


Endocrine:   Denies polyuria or polydipsia. [] 


Lymphatic:  Denies swollen glands. [] 


Psychiatric:  Denies depression or anxiety. []





Heart Score:


C/O Chest Pain:  No


Risk Factors:


Risk Factors:  DM, Current or recent (<one month) smoker, HTN, HLP, family 

history of CAD, obesity.


Risk Scores:


Score 0 - 3:  2.5% MACE over next 6 weeks - Discharge Home


Score 4 - 6:  20.3% MACE over next 6 weeks - Admit for Clinical Observation


Score 7 - 10:  72.7% MACE over next 6 weeks - Early Invasive Strategies





Current Medications:





Current Medications








 Medications


  (Trade)  Dose


 Ordered  Sig/Neptali  Start Time


 Stop Time Status Last Admin


Dose Admin


 


 Famotidine


  (Pepcid Vial)  20 mg  1X  ONCE  4/14/22 10:45


 4/14/22 10:46 DC 4/14/22 10:45


20 MG


 


 Info


  (CONTRAST GIVEN


 -- Rx MONITORING)  1 each  PRN DAILY  PRN  4/14/22 11:15


 4/16/22 11:14   





 


 Iohexol


  (Omnipaque 300


 Mg/ml)  75 ml  1X  ONCE  4/14/22 11:15


 4/14/22 11:16 DC 4/14/22 11:15


75 ML


 


 Ketorolac


 Tromethamine


  (Toradol 30mg


 Vial)  15 mg  1X  ONCE  4/14/22 10:45


 4/14/22 10:46 DC 4/14/22 10:45


15 MG


 


 Morphine Sulfate


  (Morphine


 Sulfate)  2 mg  1X  ONCE  4/14/22 10:45


 4/14/22 10:46 DC 4/14/22 10:46


2 MG


 


 Multivitamins 10


 ml/Thiamine HCl


 100 mg/Folic Acid


 1 mg/Sodium


 Chloride  1,011.2 ml


  @ 1,000.088


 mls/hr  1X  ONCE  4/14/22 12:00


 4/14/22 13:00 Cancel  














Allergies:


Allergies:





Allergies








Coded Allergies Type Severity Reaction Last Updated Verified


 


  Penicillins Allergy Intermediate rash 8/14/20 Yes











Physical Exam:


PE:





Constitutional: Well developed, well nourished, no acute distress, non-toxic 

appearance. []


HENT: Normocephalic, atraumatic, bilateral external ears normal, oropharynx 

moist, no oral exudates, nose normal. []


Eyes: PERRLA, EOMI, conjunctiva normal, no discharge. [] 


Neck: Normal range of motion, no tenderness, supple, no stridor. [] 


Cardiovascular:Heart rate regular rhythm, no murmur []


Lungs & Thorax:  Bilateral breath sounds clear to auscultation []


Abdomen: Bowel sounds normal, soft, mild epigastric tenderness, abdominal 

distention no ascites present


Skin: Warm, dry, no erythema, no rash. [] 


Back: No tenderness, no CVA tenderness. [] 


Extremities: No tenderness, no cyanosis, no clubbing, ROM intact, no edema. [] 


Neurologic: Alert and oriented X 3, normal motor function, normal sensory 

function, no focal deficits noted. []


Psychologic: Affect normal, judgement normal, mood normal. []





Current Patient Data:


Labs:





                                Laboratory Tests








Test


 4/14/22


10:25


 


White Blood Count


 5.5 x10^3/uL


(4.0-11.0)


 


Red Blood Count


 4.66 x10^6/uL


(4.30-5.70)


 


Hemoglobin


 13.7 g/dL


(13.0-17.5)


 


Hematocrit


 39.7 %


(39.0-53.0)


 


Mean Corpuscular Volume


 85 fL ()





 


Mean Corpuscular Hemoglobin 29 pg (25-35)  


 


Mean Corpuscular Hemoglobin


Concent 35 g/dL


(31-37)


 


Red Cell Distribution Width


 13.6 %


(11.5-14.5)


 


Platelet Count


 269 x10^3/uL


(140-400)


 


Neutrophils (%) (Auto) 58 % (31-73)  


 


Lymphocytes (%) (Auto) 28 % (24-48)  


 


Monocytes (%) (Auto) 9 % (0-9)  


 


Eosinophils (%) (Auto) 5 % (0-3)  H


 


Basophils (%) (Auto) 1 % (0-3)  


 


Neutrophils # (Auto)


 3.2 x10^3/uL


(1.8-7.7)


 


Lymphocytes # (Auto)


 1.5 x10^3/uL


(1.0-4.8)


 


Monocytes # (Auto)


 0.5 x10^3/uL


(0.0-1.1)


 


Eosinophils # (Auto)


 0.3 x10^3/uL


(0.0-0.7)


 


Basophils # (Auto)


 0.0 x10^3/uL


(0.0-0.2)


 


Sodium Level


 139 mmol/L


(136-145)


 


Potassium Level


 3.8 mmol/L


(3.5-5.1)


 


Chloride Level


 102 mmol/L


()


 


Carbon Dioxide Level


 28 mmol/L


(21-32)


 


Anion Gap 9 (6-14)  


 


Blood Urea Nitrogen


 14 mg/dL


(8-26)


 


Creatinine


 1.5 mg/dL


(0.7-1.3)  H


 


Estimated GFR


(Cockcroft-Gault) 59.9  





 


BUN/Creatinine Ratio 9 (6-20)  


 


Glucose Level


 129 mg/dL


(70-99)  H


 


Calcium Level


 9.1 mg/dL


(8.5-10.1)


 


Total Bilirubin


 0.3 mg/dL


(0.2-1.0)


 


Aspartate Amino Transferase


(AST) 19 U/L (15-37)





 


Alanine Aminotransferase (ALT)


 36 U/L (16-63)





 


Alkaline Phosphatase


 71 U/L


()


 


Total Protein


 7.5 g/dL


(6.4-8.2)


 


Albumin


 3.9 g/dL


(3.4-5.0)


 


Albumin/Globulin Ratio 1.1 (1.0-1.7)  


 


Lipase


 69 U/L


()  L





                                Laboratory Tests


4/14/22 10:25








                                Laboratory Tests


4/14/22 10:25








Vital Signs:





                                   Vital Signs








  Date Time  Temp Pulse Resp B/P (MAP) Pulse Ox O2 Delivery O2 Flow Rate FiO2


 


4/14/22 11:51  66  133/77 (95) 97 Room Air  


 


4/14/22 10:46   21     


 


4/14/22 10:13 97.4       





 97.4       











EKG:


EKG:


[] Patient has a normal sinus EKG with a heart rate of 75 and a QTC of 447 and a

 leftward axis otherwise normal EKG





Radiology/Procedures:


Radiology/Procedures:


[]Impression: 





1.  No acute findings in the abdomen and pelvis.


2.  Sigmoid diverticulosis without evidence of diverticulitis.





Course & Med Decision Making:


Course & Med Decision Making


Pertinent Labs and Imaging studies reviewed. (See chart for details)





[] Patient was reevaluated and states that he feels much better.  CT scan and 

blood work were reviewed.  Patient does have an appointment with his GI doctor 

and is scheduled for endoscopy at the end of the month.  Return precautions were

 discussed.  Diet modifications were also discussed and consult





Dragon Disclaimer:


Dragon Disclaimer:


This electronic medical record was generated, in whole or in part, using a voice

 recognition dictation system.





Departure


Departure


Referrals:  


RACQUEL DAMON MD (PCP)











VERNON URIBE DO                  Apr 14, 2022 14:04

## 2022-04-14 NOTE — RAD
CT ABDOMEN+PELVIS W



History: Abdominal pain, distention.



Comparison: CT abdomen and pelvis 7/7/2020



Technique: CT abdomen and pelvis with intravenous contrast. 



Findings:

The lung bases are clear. The liver, gallbladder, pancreas, spleen, adrenal glands, and kidneys are u
nremarkable.



The stomach and small bowel are unremarkable. Normal appendix. There is moderate sigmoid diverticulos
is. No pericolonic inflammatory changes to suggest diverticulitis.



The bladder and prostate are unremarkable. No free air or fluid. No abdominal pelvic adenopathy. Vasc
ular structures are unremarkable. Fat contained within the bilateral inguinal canals. Degenerative ch
anges of the lumbar spine with lower lumbar predominant disc and facet disease.



Impression: 



1.  No acute findings in the abdomen and pelvis.

2.  Sigmoid diverticulosis without evidence of diverticulitis.





------

Exposure: One or more of the following individualized dose reduction techniques were utilized for thi
s examination:  

1. Automated exposure control

2. Adjustment of the mA and/or kV according to patient size

3. Use of iterative reconstruction technique.



Electronically signed by: Justyn Hagan MD (4/14/2022 11:41 AM) [id number]

## 2022-04-14 NOTE — EKG
[facility]

              [address] [place] [address]

Test Date:    2022               Test Time:    10:45:02

Pat Name:     CARMEN CLEARY               Department:   

Patient ID:   [id number]           Room:          

Gender:       M                        Technician:   

:          1970               Requested By: VERNON URIBE

Order Number: [id number]           Reading MD:   Humphrey Paige

                                 Measurements

Intervals                              Axis          

Rate:         75                       P:            47

TN:           142                      QRS:          -21

QRSD:         98                       T:            8

QT:           398                                    

QTc:          447                                    

                           Interpretive Statements

SINUS RHYTHM

LEFTWARD AXIS

Electronically Signed On 4- 13:20:07 CDT by Humphrey Paige

## 2022-05-04 ENCOUNTER — HOSPITAL ENCOUNTER (OUTPATIENT)
Dept: HOSPITAL 61 - ENDOS | Age: 52
Discharge: HOME | End: 2022-05-04
Attending: INTERNAL MEDICINE
Payer: COMMERCIAL

## 2022-05-04 VITALS — SYSTOLIC BLOOD PRESSURE: 134 MMHG | DIASTOLIC BLOOD PRESSURE: 84 MMHG

## 2022-05-04 VITALS — SYSTOLIC BLOOD PRESSURE: 162 MMHG | DIASTOLIC BLOOD PRESSURE: 93 MMHG

## 2022-05-04 VITALS — BODY MASS INDEX: 32.65 KG/M2 | HEIGHT: 69 IN | WEIGHT: 220.46 LBS

## 2022-05-04 DIAGNOSIS — E78.00: ICD-10-CM

## 2022-05-04 DIAGNOSIS — K31.89: ICD-10-CM

## 2022-05-04 DIAGNOSIS — R10.13: ICD-10-CM

## 2022-05-04 DIAGNOSIS — K63.89: ICD-10-CM

## 2022-05-04 DIAGNOSIS — K29.50: ICD-10-CM

## 2022-05-04 DIAGNOSIS — G47.30: ICD-10-CM

## 2022-05-04 DIAGNOSIS — Z12.11: Primary | ICD-10-CM

## 2022-05-04 DIAGNOSIS — K64.0: ICD-10-CM

## 2022-05-04 DIAGNOSIS — I10: ICD-10-CM

## 2022-05-04 PROCEDURE — 43235 EGD DIAGNOSTIC BRUSH WASH: CPT

## 2022-05-04 PROCEDURE — 45378 DIAGNOSTIC COLONOSCOPY: CPT
